# Patient Record
Sex: FEMALE | Race: WHITE | NOT HISPANIC OR LATINO | Employment: PART TIME | URBAN - METROPOLITAN AREA
[De-identification: names, ages, dates, MRNs, and addresses within clinical notes are randomized per-mention and may not be internally consistent; named-entity substitution may affect disease eponyms.]

---

## 2017-03-02 LAB
BUN SERPL-MCNC: 12 MG/DL (ref 7–18)
CALCIUM SERPL-MCNC: 9 MG/DL (ref 9–10.9)
CHLORIDE SERPL-SCNC: 104 MMOL/L (ref 98–116)
CHOLEST SERPL-MCNC: 213 MG/DL
CO2 SERPL-SCNC: 31 MMOL/L (ref 15–28)
CREAT SERPL-MCNC: 0.77 MG/DL (ref 0.6–1.3)
GLUCOSE SERPL-MCNC: 99 MG/DL (ref 74–106)
HDLC SERPL-MCNC: 64 MG/DL (ref 35–60)
LDLC SERPL CALC-MCNC: 131 MG/DL
POTASSIUM SERPL-SCNC: 4.5 MMOL/L (ref 3.5–5.1)
SODIUM SERPL-SCNC: 139 MEQ/L (ref 131–145)
TRIGL SERPL-MCNC: 95 MG/DL (ref 30–150)

## 2017-12-15 ENCOUNTER — HISTORICAL (OUTPATIENT)
Dept: URGENT CARE | Facility: CLINIC | Age: 47
End: 2017-12-15

## 2017-12-15 LAB
BILIRUB SERPL-MCNC: NORMAL MG/DL
BLOOD URINE, POC: NORMAL
CLARITY, POC UA: NORMAL
COLOR, POC UA: NORMAL
GLUCOSE UR QL STRIP: NEGATIVE
KETONES UR QL STRIP: NEGATIVE
LEUKOCYTE EST, POC UA: NORMAL
NITRITE, POC UA: POSITIVE
PH, POC UA: 5
PROTEIN, POC: NORMAL
SPECIFIC GRAVITY, POC UA: 1.01
UROBILINOGEN, POC UA: NORMAL

## 2017-12-22 LAB
INFLUENZA A ANTIGEN, POC: POSITIVE
INFLUENZA B ANTIGEN, POC: NEGATIVE

## 2018-01-25 LAB — RAPID GROUP A STREP (OHS): NEGATIVE

## 2018-03-01 ENCOUNTER — HISTORICAL (OUTPATIENT)
Dept: ADMINISTRATIVE | Facility: HOSPITAL | Age: 48
End: 2018-03-01

## 2018-03-01 LAB
ABS NEUT (OLG): 3
ALBUMIN SERPL-MCNC: 4.2 GM/DL (ref 3.4–5)
ALBUMIN/GLOB SERPL: 1.65 {RATIO} (ref 1.5–2.5)
ALP SERPL-CCNC: 102 UNIT/L (ref 38–126)
ALT SERPL-CCNC: 9 UNIT/L (ref 7–52)
AST SERPL-CCNC: 13 UNIT/L (ref 15–37)
BILIRUB SERPL-MCNC: 0.6 MG/DL (ref 0.2–1)
BILIRUBIN DIRECT+TOT PNL SERPL-MCNC: <0.2 MG/DL (ref 0–0.5)
BUN SERPL-MCNC: 16 MG/DL (ref 7–18)
CALCIUM SERPL-MCNC: 9.1 MG/DL (ref 8.5–10)
CHLORIDE SERPL-SCNC: 105 MMOL/L (ref 98–107)
CHOLEST SERPL-MCNC: 185 MG/DL (ref 0–200)
CHOLEST/HDLC SERPL: 3.9 {RATIO}
CO2 SERPL-SCNC: 31 MMOL/L (ref 21–32)
CREAT SERPL-MCNC: 0.76 MG/DL (ref 0.6–1.3)
CREAT/UREA NIT SERPL: 21.1
ERYTHROCYTE [DISTWIDTH] IN BLOOD BY AUTOMATED COUNT: 13.6 % (ref 11.5–17)
GGT SERPL-CCNC: 10 UNIT/L (ref 5–85)
GLOBULIN SER-MCNC: 2.6 GM/DL (ref 1.2–3)
GLUCOSE SERPL-MCNC: 98 MG/DL (ref 74–106)
HCT VFR BLD AUTO: 39.9 % (ref 37–47)
HDLC SERPL-MCNC: 47 MG/DL (ref 35–60)
HGB BLD-MCNC: 12.7 GM/DL (ref 12–16)
LDH SERPL-CCNC: 136 UNIT/L (ref 140–271)
LDLC SERPL CALC-MCNC: 105 MG/DL (ref 0–129)
LYMPHOCYTES # BLD AUTO: 2.3 X10(3)/MCL (ref 0.6–3.4)
LYMPHOCYTES NFR BLD AUTO: 38 % (ref 13–40)
MCH RBC QN AUTO: 26.7 PG (ref 27–31.2)
MCHC RBC AUTO-ENTMCNC: 32 GM/DL (ref 32–36)
MCV RBC AUTO: 84 FL (ref 80–94)
MONOCYTES # BLD AUTO: 0.7 X10(3)/MCL (ref 0–1.8)
MONOCYTES NFR BLD AUTO: 11.1 % (ref 0.1–24)
NEUTROPHILS NFR BLD AUTO: 50.9 % (ref 47–80)
PLATELET # BLD AUTO: 192 X10(3)/MCL (ref 130–400)
PMV BLD AUTO: 10.3 FL
POTASSIUM SERPL-SCNC: 4.7 MMOL/L (ref 3.5–5.1)
PROT SERPL-MCNC: 6.8 GM/DL (ref 6.4–8.2)
RBC # BLD AUTO: 4.76 X10(6)/MCL (ref 4.2–5.4)
SODIUM SERPL-SCNC: 140 MMOL/L (ref 136–145)
TRIGL SERPL-MCNC: 95 MG/DL (ref 30–150)
TSH SERPL-ACNC: 1.26 MIU/ML (ref 0.35–4.94)
VLDLC SERPL CALC-MCNC: 19 MG/DL
WBC # SPEC AUTO: 6 X10(3)/MCL (ref 4.5–11.5)

## 2018-07-31 LAB — RAPID GROUP A STREP (OHS): POSITIVE

## 2018-12-09 LAB
BILIRUB SERPL-MCNC: NEGATIVE MG/DL
BLOOD URINE, POC: NEGATIVE
CLARITY, POC UA: NORMAL
COLOR, POC UA: YELLOW
GLUCOSE UR QL STRIP: NEGATIVE
KETONES UR QL STRIP: NEGATIVE
LEUKOCYTE EST, POC UA: NORMAL
NITRITE, POC UA: NEGATIVE
PH, POC UA: 5
PROTEIN, POC: NEGATIVE
SPECIFIC GRAVITY, POC UA: 1.02
UROBILINOGEN, POC UA: NORMAL

## 2018-12-10 ENCOUNTER — HISTORICAL (OUTPATIENT)
Dept: URGENT CARE | Facility: CLINIC | Age: 48
End: 2018-12-10

## 2018-12-12 LAB — FINAL CULTURE: NORMAL

## 2019-03-15 ENCOUNTER — HISTORICAL (OUTPATIENT)
Dept: ADMINISTRATIVE | Facility: HOSPITAL | Age: 49
End: 2019-03-15

## 2019-03-15 LAB
ABS NEUT (OLG): 3.4 X10(3)/MCL (ref 2.1–9.2)
ALBUMIN SERPL-MCNC: 4.1 GM/DL (ref 3.4–5)
ALBUMIN/GLOB SERPL: 1.71 {RATIO} (ref 1.5–2.5)
ALP SERPL-CCNC: 93 UNIT/L (ref 38–126)
ALT SERPL-CCNC: 11 UNIT/L (ref 7–52)
AST SERPL-CCNC: 16 UNIT/L (ref 15–37)
BILIRUB SERPL-MCNC: 0.5 MG/DL (ref 0.2–1)
BILIRUBIN DIRECT+TOT PNL SERPL-MCNC: 0.1 MG/DL (ref 0–0.5)
BILIRUBIN DIRECT+TOT PNL SERPL-MCNC: 0.4 MG/DL
BUN SERPL-MCNC: 15 MG/DL (ref 7–18)
CALCIUM SERPL-MCNC: 8.5 MG/DL (ref 8.5–10)
CHLORIDE SERPL-SCNC: 107 MMOL/L (ref 98–107)
CHOLEST SERPL-MCNC: 207 MG/DL (ref 0–200)
CHOLEST/HDLC SERPL: 3.7 {RATIO}
CO2 SERPL-SCNC: 30 MMOL/L (ref 21–32)
CREAT SERPL-MCNC: 0.73 MG/DL (ref 0.6–1.3)
ERYTHROCYTE [DISTWIDTH] IN BLOOD BY AUTOMATED COUNT: 13.4 % (ref 11.5–17)
GLOBULIN SER-MCNC: 2.4 GM/DL (ref 1.2–3)
GLUCOSE SERPL-MCNC: 96 MG/DL (ref 74–106)
HCT VFR BLD AUTO: 39.4 % (ref 37–47)
HDLC SERPL-MCNC: 56 MG/DL (ref 35–60)
HGB BLD-MCNC: 13 GM/DL (ref 12–16)
LDLC SERPL CALC-MCNC: 105 MG/DL (ref 0–129)
LYMPHOCYTES # BLD AUTO: 2.4 X10(3)/MCL (ref 0.6–3.4)
LYMPHOCYTES NFR BLD AUTO: 39.4 % (ref 13–40)
MCH RBC QN AUTO: 28.4 PG (ref 27–31.2)
MCHC RBC AUTO-ENTMCNC: 33 GM/DL (ref 32–36)
MCV RBC AUTO: 86 FL (ref 80–94)
MONOCYTES # BLD AUTO: 0.4 X10(3)/MCL (ref 0.1–1.3)
MONOCYTES NFR BLD AUTO: 6.1 % (ref 0.1–24)
NEUTROPHILS NFR BLD AUTO: 54.5 % (ref 47–80)
PLATELET # BLD AUTO: 181 X10(3)/MCL (ref 130–400)
PMV BLD AUTO: 9.2 FL (ref 9.4–12.4)
POTASSIUM SERPL-SCNC: 3.9 MMOL/L (ref 3.5–5.1)
PROT SERPL-MCNC: 6.5 GM/DL (ref 6.4–8.2)
RBC # BLD AUTO: 4.57 X10(6)/MCL (ref 4.2–5.4)
SODIUM SERPL-SCNC: 139 MMOL/L (ref 136–145)
TRIGL SERPL-MCNC: 88 MG/DL (ref 30–150)
TSH SERPL-ACNC: 2.22 MIU/ML (ref 0.35–4.94)
VLDLC SERPL CALC-MCNC: 17.6 MG/DL
WBC # SPEC AUTO: 6.2 X10(3)/MCL (ref 4.5–11.5)

## 2019-06-22 ENCOUNTER — HISTORICAL (OUTPATIENT)
Dept: URGENT CARE | Facility: CLINIC | Age: 49
End: 2019-06-22

## 2019-06-22 LAB
BILIRUB SERPL-MCNC: NORMAL MG/DL
BLOOD URINE, POC: NORMAL
CLARITY, POC UA: CLEAR
COLOR, POC UA: NORMAL
GLUCOSE UR QL STRIP: NEGATIVE
KETONES UR QL STRIP: NEGATIVE
LEUKOCYTE EST, POC UA: NEGATIVE
NITRITE, POC UA: POSITIVE
PH, POC UA: 6.5
PROTEIN, POC: NORMAL
SPECIFIC GRAVITY, POC UA: 1.01
UROBILINOGEN, POC UA: NORMAL

## 2019-06-24 LAB — FINAL CULTURE: NO GROWTH

## 2020-05-10 ENCOUNTER — HISTORICAL (OUTPATIENT)
Dept: URGENT CARE | Facility: CLINIC | Age: 50
End: 2020-05-10

## 2020-05-10 LAB
BILIRUB SERPL-MCNC: NEGATIVE MG/DL
BLOOD URINE, POC: NEGATIVE
CLARITY, POC UA: CLEAR
COLOR, POC UA: YELLOW
GLUCOSE UR QL STRIP: NEGATIVE
KETONES UR QL STRIP: NEGATIVE
LEUKOCYTE EST, POC UA: NEGATIVE
NITRITE, POC UA: NEGATIVE
PH, POC UA: 5
PROTEIN, POC: NEGATIVE
SPECIFIC GRAVITY, POC UA: 1.02
UROBILINOGEN, POC UA: NORMAL

## 2020-05-12 LAB — FINAL CULTURE: NO GROWTH

## 2020-06-15 ENCOUNTER — HISTORICAL (OUTPATIENT)
Dept: ADMINISTRATIVE | Facility: HOSPITAL | Age: 50
End: 2020-06-15

## 2020-06-15 LAB
ABS NEUT (OLG): 3.4 X10(3)/MCL (ref 2.1–9.2)
ALBUMIN SERPL-MCNC: 4.5 GM/DL (ref 3.4–5)
ALBUMIN/GLOB SERPL: 1.96 {RATIO} (ref 1.5–2.5)
ALP SERPL-CCNC: 99 UNIT/L (ref 38–126)
ALT SERPL-CCNC: 15 UNIT/L (ref 7–52)
AST SERPL-CCNC: 14 UNIT/L (ref 15–37)
BILIRUB SERPL-MCNC: 0.5 MG/DL (ref 0.2–1)
BILIRUBIN DIRECT+TOT PNL SERPL-MCNC: 0.1 MG/DL (ref 0–0.5)
BILIRUBIN DIRECT+TOT PNL SERPL-MCNC: 0.4 MG/DL
BUN SERPL-MCNC: 18 MG/DL (ref 7–25)
CALCIUM SERPL-MCNC: 9.3 MG/DL (ref 8.5–10.1)
CHLORIDE SERPL-SCNC: 106 MMOL/L (ref 98–107)
CHOLEST SERPL-MCNC: 205 MG/DL (ref 0–200)
CHOLEST/HDLC SERPL: 3.2 {RATIO}
CO2 SERPL-SCNC: 31 MMOL/L (ref 21–32)
CREAT SERPL-MCNC: 0.84 MG/DL (ref 0.6–1.3)
ERYTHROCYTE [DISTWIDTH] IN BLOOD BY AUTOMATED COUNT: 12.6 % (ref 11.5–17)
GLOBULIN SER-MCNC: 2.3 GM/DL (ref 1.2–3)
GLUCOSE SERPL-MCNC: 99 MG/DL (ref 74–106)
HCT VFR BLD AUTO: 42.8 % (ref 42–52)
HDLC SERPL-MCNC: 64 MG/DL (ref 35–60)
HGB BLD-MCNC: 13.6 GM/DL (ref 14–18)
LDLC SERPL CALC-MCNC: 107 MG/DL (ref 0–129)
LYMPHOCYTES # BLD AUTO: 2.3 X10(3)/MCL (ref 0.6–3.4)
LYMPHOCYTES NFR BLD AUTO: 36.8 % (ref 13–40)
MCH RBC QN AUTO: 27.8 PG (ref 27–31)
MCHC RBC AUTO-ENTMCNC: 32 GM/DL (ref 33–36)
MCV RBC AUTO: 88 FL (ref 80–94)
MONOCYTES # BLD AUTO: 0.5 X10(3)/MCL (ref 0.1–1.3)
MONOCYTES NFR BLD AUTO: 7.3 % (ref 0.1–24)
NEUTROPHILS NFR BLD AUTO: 55.9 % (ref 47–80)
PLATELET # BLD AUTO: 200 X10(3)/MCL (ref 130–400)
PMV BLD AUTO: 9.8 FL (ref 9.4–12.4)
POTASSIUM SERPL-SCNC: 4.5 MMOL/L (ref 3.5–5.1)
PROT SERPL-MCNC: 6.8 GM/DL (ref 6.4–8.2)
RBC # BLD AUTO: 4.89 X10(6)/MCL (ref 4.7–6.1)
SODIUM SERPL-SCNC: 143 MMOL/L (ref 136–145)
TRIGL SERPL-MCNC: 79 MG/DL (ref 30–150)
TSH SERPL-ACNC: 1.52 MIU/ML (ref 0.35–4.94)
VLDLC SERPL CALC-MCNC: 15.8 MG/DL
WBC # SPEC AUTO: 6.2 X10(3)/MCL (ref 4.5–11.5)

## 2022-02-24 ENCOUNTER — HISTORICAL (OUTPATIENT)
Dept: ADMINISTRATIVE | Facility: HOSPITAL | Age: 52
End: 2022-02-24

## 2022-02-27 ENCOUNTER — HISTORICAL (OUTPATIENT)
Dept: ADMINISTRATIVE | Facility: HOSPITAL | Age: 52
End: 2022-02-27

## 2022-03-08 ENCOUNTER — HISTORICAL (OUTPATIENT)
Dept: ADMINISTRATIVE | Facility: HOSPITAL | Age: 52
End: 2022-03-08

## 2022-05-02 NOTE — HISTORICAL [facility] CERNER
This is a historical note converted from [employer]. Formatting and pictures may have been removed.  Please reference [employer] for original formatting and attached multimedia. Chief Complaint  WELLNESS CPX FAST  History of Present Illness  48 year old WF presents fasting for annual wellness  PMH: IBS  ?  , no kids, Homemaker  No Tob, No EtOH  No exercise  Diet: paleo diet  ?  GYN (Dr Jimmy Diallo)- PAP/MMG  GI (Dr Hall)  Review of Systems  Constitutional:?no fever, fatigue, weakness  Eye:?no vision loss, eye redness, drainage, or pain  ENMT:?no sore throat, ear pain, sinus pain/congestion, nasal congestion/drainage  Respiratory:?no cough, no wheezing, no shortness of breath  Cardiovascular:?no chest pain, no palpitations, no edema  Gastrointestinal:?no nausea, vomiting, or diarrhea. No abdominal pain  Genitourinary:?no dysuria, no urinary frequency or urgency, no hematuria  Hema/Lymph:?no abnormal bruising or bleeding  Endocrine:?no heat or cold intolerance, no excessive thirst or excessive urination  Musculoskeletal:?no muscle or joint pain, no joint swelling  Integumentary:?no skin rash or abnormal lesion  Neurologic: no headache, no dizziness, no weakness or numbness  ?  Physical Exam  Vitals & Measurements  T:?36.8? ?C (Oral)? HR:?56(Peripheral)? BP:?136/84?  HT:?162?cm? WT:?96.8?kg? BMI:?36.88?  General:?well-developed well-nourished in no acute distress  Eye: PERRLA, EOMI, clear conjunctiva, eyelids normal  HENT:?TMs/ear canals clear, oropharynx without erythema/exudate, oropharynx and nasal mucosal surfaces moist, no maxillary/frontal sinus tenderness to palpation  Neck: full range of motion, no thyromegaly or lymphadenopathy  Respiratory:?clear to auscultation bilaterally  Cardiovascular:?regular rate and rhythm without murmurs, gallops or rubs  Gastrointestinal:?soft, non-tender, non-distended with normal bowel sounds, without masses to palpation  Genitourinary: no CVA tenderness to  palpation  Musculoskeletal:?full range of motion of all extremities/spine without limitation or discomfort  Integumentary: no rashes or skin lesions present  Neurologic: cranial nerves intact, no signs of peripheral neurological deficit, motor/sensory function intact  ?  Assessment/Plan  1.?Well adult exam?Z00.00  ?LABS: CBC, CMP, TSH, FLP  Ordered:  Comprehensive Metabolic Panel, Routine collect, 03/15/19 7:33:00 CDT, Blood, Stop date 03/15/19 7:33:00 CDT, Lab Collect, Well adult exam, 03/15/19 7:33:00 CDT  Lipid Panel, Routine collect, 03/15/19 7:33:00 CDT, Blood, Stop date 03/15/19 7:33:00 CDT, Lab Collect, Well adult exam, 03/15/19 7:33:00 CDT  Thyroid Stimulating Hormone, Routine collect, 03/15/19 7:33:00 CDT, Blood, Stop date 03/15/19 7:33:00 CDT, Lab Collect, Well adult exam, 03/15/19 7:33:00 CDT  ?  2.?Irritable bowel syndrome?K58.9  Diet controlled  ?   Problem List/Past Medical History  Ongoing  Irritable bowel syndrome  Obesity  Historical  Abdominal pain  Diverticulitis  Lesion of liver  Obesity  Procedure/Surgical History  left wrist sx. 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 Exercise frequency: 3-4 times/week. Exercise type: Aerobics, Weight lifting., 03/01/2018  Home/Environment  Lives with Spouse. Living situation: Home/Independent., 01/29/2018  Nutrition/Health  Caffeine intake amount: 1 cup/day., 01/29/2018  Substance Abuse  Never, 12/15/2017  Tobacco - Denies Tobacco Use, 06/07/2015  Never (less than 100 in lifetime), N/A, 03/15/2019  Family History  COPD (chronic obstructive pulmonary disease).: Father.  Chronic renal failure: Mother.  Congestive heart disease.: Father.  Diabetes mellitus type 2: Mother, Father and Grandmother.  Ovarian cancer: Mother.  Health  Maintenance  Health Maintenance  ???Pending?(in the next year)  ??? ??OverDue  ??? ? ? ?Diabetes Screening due??and every?  ??? ??Due?  ??? ? ? ?ADL Screening due??03/15/19??and every 1??year(s)  ??? ? ? ?Alcohol Misuse Screening due??03/15/19??and every 1??year(s)  ??? ? ? ?Tetanus Vaccine due??03/15/19??and every 10??year(s)  ??? ??Due In Future?  ??? ? ? ?Blood Pressure Screening not due until??03/14/20??and every 1??year(s)  ??? ? ? ?Body Mass Index Check not due until??03/14/20??and every 1??year(s)  ??? ? ? ?Depression Screening not due until??03/14/20??and every 1??year(s)  ???Satisfied?(in the past 1 year)  ??? ??Satisfied?  ??? ? ? ?Blood Pressure Screening on??03/15/19.??Satisfied by Alannah Laurent LPN  ??? ? ? ?Body Mass Index Check on??03/15/19.??Satisfied by Alannah Laurent LPN  ??? ? ? ?Cervical Cancer Screening on??03/14/19.??Satisfied by Mahnaz Larkin  ??? ? ? ?Depression Screening on??03/15/19.??Satisfied by Alannah Laurent LPN  ??? ? ? ?Influenza Vaccine on??03/15/19.??Satisfied by Alannah Laurent LPN  ??? ? ? ?Obesity Screening on??03/15/19.??Satisfied by Alannah Laurent LPN  ?  ?

## 2022-05-02 NOTE — HISTORICAL [facility]
This is a historical note converted from [employer]. Formatting and pictures may have been removed.  Please reference [employer]an for original formatting and attached multimedia. Chief Complaint  WELLNESS CPX FAST  History of Present Illness  Here for CPX  Doing great. On Paleo-type diet for past year. Has lost approx 20lbs and has basically resolved her IBS issues. Hasnt Taken IBS meds for about 6 months.  ?  Review of Systems  Constitutional:?no fever, no weakness, no weight loss, no fatigue  Eye:?no eye redness, drainage, or pain  ENMT:?sore?throat pain, postnasal drainage, mucus production  Respiratory:?no wheezing,?no shortness of breath  Cardiovascular:?no chest pain, no STEARNS  Gastrointestinal:?no nausea, vomiting, or diarrhea. No abdominal mercedes, no hematochezia or melena  Musculoskeletal:?no muscle or joint pain, no joint swelling  Integumentary:?no skin rash or abnormal lesion  Neuro:?no headaches, dizziness, or weakness  Psych:?no anxiety, depression, or SI/HI  Endo:?no polyuria, polydipsia, or polyphagia  Heme/Lymph:?no bruising or lymphadenopathy  ?  Physical Exam  Vitals & Measurements  T:?36.7? ?C (Temporal Artery)? HR:?56(Peripheral)? BP:?112/72?  HT:?162.56?cm? HT:?162.56?cm? WT:?91.0?kg? WT:?91.0?kg? BMI:?34.44?  General:?Well developed, well-nourished, in no acute distress  Eye:?clear conjunctiva, eyelids normal  HENT:?no erythema, no exudate or swelling, TMs clear  Neck:?full range of motion, no cervical lymphadenopathy  Respiratory:?clear to auscultation bilaterally  Cardiovascular:?regular rate and rhythm without murmurs, gallops or rubs  Abdomen:?soft, NT, ND, NABS x4, no HSM  M/S:?no tenderness, joints WNL, FROM, neg SLR, no CCE  Neuro:?no motor/sensory deficits, Reflexes 2+ throughout, CN II-XII intact  Integumentary:?no rashes or skin lesions present  LN:?WNL  ?  Assessment/Plan  1.?Routine check-up  Ordered:  CBC w/ Auto Diff, Routine collect, 03/01/18 8:21:00 CST, Blood, Order for future visit, Stop  date 03/01/18 8:21:00 CST, Lab Collect, Routine check-up, 03/01/18 8:21:00 CST  CMP, Routine collect, 03/01/18 8:21:00 CST, Blood, Order for future visit, Stop date 03/01/18 8:21:00 CST, Lab Collect, Routine check-up, 03/01/18 8:21:00 CST  Lipid Panel, Routine collect, 03/01/18 8:21:00 CST, Blood, Order for future visit, Stop date 03/01/18 8:21:00 CST, Lab Collect, Routine check-up, 03/01/18 8:21:00 CST  Preventative Health Care New 40-64 years [id number] PC, Routine check-up, 03/01/18 8:21:00 CST  Thyroid Stimulating Hormone, Routine collect, 03/01/18 8:21:00 CST, Blood, Order for future visit, Stop date 03/01/18 8:21:00 CST, Lab Collect, Routine check-up, 03/01/18 8:21:00 CST  ?  2.?IBS (irritable bowel syndrome)  No meds at current  Diet controlled  ?  Orders:  Clinic Follow-up PRN, 03/01/18 8:21:00 CST, [facility] - AFP, Future Order  Rx given for screening mammogram   Problem List/Past Medical History  Ongoing  Irritable bowel syndrome  Historical  Abdominal pain  Diverticulitis  Lesion of liver  Obesity  Procedure/Surgical History  Mammogram - screening (02/23/2017), left wrist sx. (2001), S/P wrist surgery, Wrist.  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 Exercise frequency: 3-4 times/week. Exercise type: Aerobics, Weight lifting., 03/01/2018  Home/Environment  Lives with Spouse. Living situation: Home/Independent., 01/29/2018  Nutrition/Health  Caffeine intake amount: 1 cup/day., 01/29/2018  Substance Abuse  Never, 12/15/2017  Tobacco - Denies Tobacco Use, 06/07/2015  Never smoker, 06/07/2015  Family History  COPD (chronic obstructive pulmonary disease).: Father.  Chronic renal failure: Mother.  Congestive  heart disease.: Father.  Diabetes mellitus type 2: Mother, Father and Grandmother.  Ovarian cancer: Mother.

## 2022-05-02 NOTE — HISTORICAL [facility]
This is a historical note converted from [employer]. Formatting and pictures may have been removed.  Please reference [employer] for original formatting and attached multimedia. Chief Complaint  WELLNESS CPX FAST, DISCUSS ABD PAIN STILL PRESENT, HX OF PYLOPS  History of Present Illness  49 year old WF presents fasting for annual wellness  PMH: IBS  ?   , no kids, Works in [employer]  No Tob, No EtOH  Exercise: walks 3-4miles/week  Diet: paleo diet  ?   GYN (Dr Jimmy Diallo)- PAP/MMG  GI (Dr Hall)  ?   Seen here on 5/14/20: started on Omeprazole and overall states symptoms of epigastric discomfort has subsided. Has also changed diet and eating more fruits/veggies/salads, and chicken.  Review of Systems  Constitutional:?no fever, fatigue, weakness  Eye:?no vision loss, eye redness, drainage, or pain  ENMT:?no sore throat, ear pain, sinus pain/congestion  Respiratory:?no cough, no wheezing, no shortness of breath  Cardiovascular:?no chest pain, no palpitations, no edema  Gastrointestinal:?no nausea, vomiting, or diarrhea. No abdominal pain  Genitourinary:?no dysuria, no urinary frequency or urgency, no hematuria  Hema/Lymph:?no abnormal bruising or bleeding  Endocrine:?no heat or cold intolerance, no excessive thirst or excessive urination  Musculoskeletal:?no muscle or joint pain, no joint swelling  Integumentary:?no skin rash or abnormal lesion  Neurologic: no headache, no dizziness, no weakness or numbness  Physical Exam  Vitals & Measurements  T:?36.6? ?C (Oral)? HR:?64(Peripheral)? BP:?122/84?  HT:?162?cm? WT:?105.5?kg? BMI:?40.2?  General:?well-developed well-nourished in no acute distress  Eye: PERRLA, EOMI, clear conjunctiva, eyelids normal  HENT:?TMs/ear canals clear, oropharynx without erythema/exudate, oropharynx and nasal mucosal surfaces moist, no maxillary/frontal sinus tenderness to palpation  Neck: full range of motion, no thyromegaly or lymphadenopathy  Respiratory:?clear to  auscultation bilaterally  Cardiovascular:?regular rate and rhythm without murmurs, gallops or rubs  Gastrointestinal:?soft, non-tender, non-distended with normal bowel sounds, without masses to palpation  Genitourinary: no CVA tenderness to palpation  Musculoskeletal:?full range of motion of all extremities/spine without limitation or discomfort  Integumentary: no rashes or skin lesions present  Neurologic: cranial nerves intact, no signs of peripheral neurological deficit, motor/sensory function intact  Assessment/Plan  1.?Wellness examination?Z00.00  ?LABS: CBC, CMP, TSH, FLP  Ordered:  Comprehensive Metabolic Panel, Routine collect, 06/15/20 10:23:00 CDT, Blood, Stop date 06/15/20 10:23:00 CDT, Lab Collect, Wellness examination, 06/15/20 10:23:00 CDT  Lipid Panel, Routine collect, 06/15/20 10:23:00 CDT, Blood, Stop date 06/15/20 10:23:00 CDT, Lab Collect, Wellness examination, 06/15/20 10:23:00 CDT  Thyroid Stimulating Hormone, Routine collect, 06/15/20 10:23:00 CDT, Blood, Stop date 06/15/20 10:23:00 CDT, Lab Collect, Wellness examination, 06/15/20 10:23:00 CDT  ?  2.?Irritable bowel syndrome?K58.9  ?  3.?Epigastric fullness?R19.06  ?Last U/S 10/2017: indicated slight GB dysfunction along with small renal cyst  ?Recommend repeating U/S  ?   Problem List/Past Medical History  Ongoing  Irritable bowel syndrome  Obesity  Historical  Abdominal pain  Diverticulitis  Lesion of liver  Obesity  Procedure/Surgical History  left wrist sx. (2001)   Medications  cloNIDine, 0.1 mg= 1 tab(s), Oral, Once  Multivitamins and Minerals oral tablet, 1 tab, Oral, Daily  Probiotic Formula (Bacillus Coagulans), Oral, Daily  Vitamin B-12 100 mcg oral tablet, 100 mcg= 1 tab(s), Oral, Daily  Vitamin C 500 mg oral tablet, chewable, 1000 mg= 2 tab(s), Chewed, Daily  Allergies  No Known Allergies  Social History  Abuse/Neglect  No, 06/15/2020  Alcohol - Denies Alcohol Use, 06/07/2015  Never, 12/15/2017  Employment/School  Employed, Part  time, 06/15/2020  Home/Environment  Lives with Spouse. Living situation: Home/Independent., 01/29/2018  Nutrition/Health  Caffeine intake amount: 1 cup/day., 01/29/2018  Substance Use  Never, 12/15/2017  Tobacco - Denies Tobacco Use, 06/07/2015  Never (less than 100 in lifetime), N/A, 06/15/2020  Family History  COPD (chronic obstructive pulmonary disease).: Father.  Chronic renal failure: Mother.  Congestive heart disease.: Father.  Diabetes mellitus type 2: Mother, Father and Grandmother.  Ovarian cancer: Mother.  Health Maintenance  Health Maintenance  ???Pending?(in the next year)  ??? ??OverDue  ??? ? ? ?Diabetes Screening due??and every?  ??? ? ? ?Alcohol Misuse Screening due??01/01/20??and every 1??year(s)  ??? ??Due?  ??? ? ? ?ADL Screening due??06/15/20??and every 1??year(s)  ??? ? ? ?Tetanus Vaccine due??06/15/20??and every 10??year(s)  ??? ??Due In Future?  ??? ? ? ?Hypertension Management-BMP not due until??11/05/20??and every 1??year(s)  ??? ? ? ?Obesity Screening not due until??01/01/21??and every 1??year(s)  ???Satisfied?(in the past 1 year)  ??? ??Satisfied?  ??? ? ? ?Blood Pressure Screening on??06/15/20.??Satisfied by Alannah Laurent LPN  ??? ? ? ?Body Mass Index Check on??06/15/20.??Satisfied by Alannah Laurent LPN  ??? ? ? ?Depression Screening on??06/15/20.??Satisfied by Alannah Laurent LPN  ??? ? ? ?Diabetes Screening on??11/06/19.??Satisfied by Beatrice Adkins  ??? ? ? ?Hypertension Management-Blood Pressure on??06/15/20.??Satisfied by Alannah Laurent LPN  ??? ? ? ?Influenza Vaccine on??12/29/19.??Satisfied by Catalina Beckwith  ??? ? ? ?Obesity Screening on??06/15/20.??Satisfied by Alannah Laurent LPN  ?      Patient condition discussed?in detail with nurse practitioner.? Agree with plan of care?and follow-up.

## 2022-06-11 ENCOUNTER — OFFICE VISIT (OUTPATIENT)
Dept: URGENT CARE | Facility: CLINIC | Age: 52
End: 2022-06-11
Payer: COMMERCIAL

## 2022-06-11 VITALS
WEIGHT: 210 LBS | OXYGEN SATURATION: 97 % | TEMPERATURE: 99 F | DIASTOLIC BLOOD PRESSURE: 68 MMHG | BODY MASS INDEX: 35.85 KG/M2 | HEART RATE: 80 BPM | HEIGHT: 64 IN | SYSTOLIC BLOOD PRESSURE: 103 MMHG

## 2022-06-11 DIAGNOSIS — N30.00 ACUTE CYSTITIS WITHOUT HEMATURIA: Primary | ICD-10-CM

## 2022-06-11 DIAGNOSIS — R30.0 DYSURIA: ICD-10-CM

## 2022-06-11 LAB
BILIRUB UR QL STRIP: POSITIVE
GLUCOSE UR QL STRIP: NEGATIVE
KETONES UR QL STRIP: NEGATIVE
LEUKOCYTE ESTERASE UR QL STRIP: POSITIVE
PH, POC UA: 5
POC BLOOD, URINE: NEGATIVE
POC NITRATES, URINE: NEGATIVE
PROT UR QL STRIP: NEGATIVE
SP GR UR STRIP: 1.03 (ref 1–1.03)
UROBILINOGEN UR STRIP-ACNC: 2 (ref 0.1–1.1)

## 2022-06-11 PROCEDURE — 81003 POCT URINALYSIS, DIPSTICK, AUTOMATED, W/O SCOPE: ICD-10-PCS | Mod: QW,,, | Performed by: FAMILY MEDICINE

## 2022-06-11 PROCEDURE — 99213 OFFICE O/P EST LOW 20 MIN: CPT | Mod: ,,, | Performed by: FAMILY MEDICINE

## 2022-06-11 PROCEDURE — 1160F PR REVIEW ALL MEDS BY PRESCRIBER/CLIN PHARMACIST DOCUMENTED: ICD-10-PCS | Mod: CPTII,,, | Performed by: FAMILY MEDICINE

## 2022-06-11 PROCEDURE — 4010F PR ACE/ARB THEARPY RXD/TAKEN: ICD-10-PCS | Mod: CPTII,,, | Performed by: FAMILY MEDICINE

## 2022-06-11 PROCEDURE — 1159F MED LIST DOCD IN RCRD: CPT | Mod: CPTII,,, | Performed by: FAMILY MEDICINE

## 2022-06-11 PROCEDURE — 99213 PR OFFICE/OUTPT VISIT, EST, LEVL III, 20-29 MIN: ICD-10-PCS | Mod: ,,, | Performed by: FAMILY MEDICINE

## 2022-06-11 PROCEDURE — 81003 URINALYSIS AUTO W/O SCOPE: CPT | Mod: QW,,, | Performed by: FAMILY MEDICINE

## 2022-06-11 PROCEDURE — 1160F RVW MEDS BY RX/DR IN RCRD: CPT | Mod: CPTII,,, | Performed by: FAMILY MEDICINE

## 2022-06-11 PROCEDURE — 3074F PR MOST RECENT SYSTOLIC BLOOD PRESSURE < 130 MM HG: ICD-10-PCS | Mod: CPTII,,, | Performed by: FAMILY MEDICINE

## 2022-06-11 PROCEDURE — 3008F PR BODY MASS INDEX (BMI) DOCUMENTED: ICD-10-PCS | Mod: CPTII,,, | Performed by: FAMILY MEDICINE

## 2022-06-11 PROCEDURE — 3008F BODY MASS INDEX DOCD: CPT | Mod: CPTII,,, | Performed by: FAMILY MEDICINE

## 2022-06-11 PROCEDURE — 1159F PR MEDICATION LIST DOCUMENTED IN MEDICAL RECORD: ICD-10-PCS | Mod: CPTII,,, | Performed by: FAMILY MEDICINE

## 2022-06-11 PROCEDURE — 3074F SYST BP LT 130 MM HG: CPT | Mod: CPTII,,, | Performed by: FAMILY MEDICINE

## 2022-06-11 PROCEDURE — 3078F DIAST BP <80 MM HG: CPT | Mod: CPTII,,, | Performed by: FAMILY MEDICINE

## 2022-06-11 PROCEDURE — 3078F PR MOST RECENT DIASTOLIC BLOOD PRESSURE < 80 MM HG: ICD-10-PCS | Mod: CPTII,,, | Performed by: FAMILY MEDICINE

## 2022-06-11 PROCEDURE — 4010F ACE/ARB THERAPY RXD/TAKEN: CPT | Mod: CPTII,,, | Performed by: FAMILY MEDICINE

## 2022-06-11 RX ORDER — SULFAMETHOXAZOLE AND TRIMETHOPRIM 200; 40 MG/5ML; MG/5ML
10 SUSPENSION ORAL EVERY 12 HOURS
Qty: 100 ML | Refills: 0 | Status: SHIPPED | OUTPATIENT
Start: 2022-06-11 | End: 2022-06-11

## 2022-06-11 RX ORDER — SULFAMETHOXAZOLE AND TRIMETHOPRIM 800; 160 MG/1; MG/1
1 TABLET ORAL 2 TIMES DAILY
Qty: 10 TABLET | Refills: 0 | Status: SHIPPED | OUTPATIENT
Start: 2022-06-11 | End: 2022-06-16

## 2022-06-11 RX ORDER — LISINOPRIL 10 MG/1
10 TABLET ORAL DAILY
COMMUNITY
Start: 2022-05-20

## 2022-06-11 NOTE — PROGRESS NOTES
"Subjective:       Patient ID: Dulce Maria Solis is a 51 y.o. female.    Vitals:  height is 5' 4" (1.626 m) and weight is 95.3 kg (210 lb). Her temperature is 98.7 °F (37.1 °C). Her blood pressure is 103/68 and her pulse is 80. Her oxygen saturation is 97%.     Chief Complaint: Urinary Tract Infection (Symptoms since yesterday.  Burning around opening of urethra)    Urinary Tract Infection   This is a new problem. The current episode started yesterday. The problem occurs every urination. The problem has been gradually worsening. The quality of the pain is described as burning. The pain is at a severity of 2/10. The pain is mild. There has been no fever. The fever has been present for less than 1 day. She is sexually active. There is no history of pyelonephritis. She has tried nothing for the symptoms. The treatment provided no relief.     [place] :  51-year-old with known history of hypertension currently on medications.  Present to clinic with concerns of bladder symptoms since yesterday.  Mainly complains of burning with urination, urgency and frequency.  No blood in the urine.  No fever, no flank pain.  Last bladder infection 2 years ago.  Reviewed the blood pressure with patient.  States in the past blood pressure has been elevated.  Started taking medications since March 2022 has an appointment with primary MD in October.  Discussed in detail on low blood pressure and encouraged to monitor the blood pressure and maintain the log.  States patient has started dietary changes and lost some weight.    ROS  :  Constitutional : _No fever, no fatigue or weakness  Neck : _Negative except HPI  Respiratory :_ No coughing, no shortness of breath  Cardiovascular : _No chest pain, no palpitations  Gastrointestinal : _No nausea, no vomiting  Genitourinary : _No flank pain, no vaginal discharge  Integumentary : _Negative for skin rash  Objective:      Physical Exam    General : Alert and Oriented, No apparent distress, " afebrile  Neck - supple  Respiratory : Bilateral equal breath sounds, nonlabored respirations, clear to auscultate   Cardiovascular : Rate rhythm regular, normal volume pulse  Gastrointestinal : Soft, mild suprapubic pressure on palpation , BS X 4 quadrants - normal  Genitourinary : No CVA tenderness Bilateral  Integumentary : Warm, Dry and no rash  Assessment:       1. Acute cystitis without hematuria    2. Dysuria          Plan:       Adequate hydration. Medications as prescribed.   Deferred urine cultures.  For persistent symptoms will consider urine culture.  Voiced understanding   ER precautions with worsening symptoms, fever, flank pain, not able to urinate.   Call or return to clinic as needed. Voiced understanding verbally.  Urine dipstick results shared.  AZO over-the-counter or bladder symptoms as needed  Acute cystitis without hematuria  -     sulfamethoxazole-trimethoprim 200-40 mg/5 ml (BACTRIM,SEPTRA) 200-40 mg/5 mL Susp; Take 10 mLs by mouth every 12 (twelve) hours. for 5 days  Dispense: 100 mL; Refill: 0    Dysuria  -     POCT Urinalysis, Dipstick, Automated, W/O Scope    Discussed in detail on blood pressure in the clinic today.  Encouraged to monitor blood pressure and maintain the log.  With dietary changes and weight loss possible improved blood pressure.  Patient will follow-up with primary MD with the log.  Call this clinic for any questions

## 2022-06-29 ENCOUNTER — OFFICE VISIT (OUTPATIENT)
Dept: URGENT CARE | Facility: CLINIC | Age: 52
End: 2022-06-29
Payer: COMMERCIAL

## 2022-06-29 VITALS
HEIGHT: 64 IN | DIASTOLIC BLOOD PRESSURE: 82 MMHG | OXYGEN SATURATION: 98 % | HEART RATE: 76 BPM | TEMPERATURE: 99 F | RESPIRATION RATE: 18 BRPM | SYSTOLIC BLOOD PRESSURE: 122 MMHG | WEIGHT: 212 LBS | BODY MASS INDEX: 36.19 KG/M2

## 2022-06-29 DIAGNOSIS — R21 RASH/SKIN ERUPTION: Primary | ICD-10-CM

## 2022-06-29 PROCEDURE — 3079F PR MOST RECENT DIASTOLIC BLOOD PRESSURE 80-89 MM HG: ICD-10-PCS | Mod: CPTII,,, | Performed by: FAMILY MEDICINE

## 2022-06-29 PROCEDURE — 3008F BODY MASS INDEX DOCD: CPT | Mod: CPTII,,, | Performed by: FAMILY MEDICINE

## 2022-06-29 PROCEDURE — 1160F RVW MEDS BY RX/DR IN RCRD: CPT | Mod: CPTII,,, | Performed by: FAMILY MEDICINE

## 2022-06-29 PROCEDURE — 99212 OFFICE O/P EST SF 10 MIN: CPT | Mod: ,,, | Performed by: FAMILY MEDICINE

## 2022-06-29 PROCEDURE — 99212 PR OFFICE/OUTPT VISIT, EST, LEVL II, 10-19 MIN: ICD-10-PCS | Mod: ,,, | Performed by: FAMILY MEDICINE

## 2022-06-29 PROCEDURE — 3074F SYST BP LT 130 MM HG: CPT | Mod: CPTII,,, | Performed by: FAMILY MEDICINE

## 2022-06-29 PROCEDURE — 3074F PR MOST RECENT SYSTOLIC BLOOD PRESSURE < 130 MM HG: ICD-10-PCS | Mod: CPTII,,, | Performed by: FAMILY MEDICINE

## 2022-06-29 PROCEDURE — 3079F DIAST BP 80-89 MM HG: CPT | Mod: CPTII,,, | Performed by: FAMILY MEDICINE

## 2022-06-29 PROCEDURE — 3008F PR BODY MASS INDEX (BMI) DOCUMENTED: ICD-10-PCS | Mod: CPTII,,, | Performed by: FAMILY MEDICINE

## 2022-06-29 PROCEDURE — 4010F ACE/ARB THERAPY RXD/TAKEN: CPT | Mod: CPTII,,, | Performed by: FAMILY MEDICINE

## 2022-06-29 PROCEDURE — 4010F PR ACE/ARB THEARPY RXD/TAKEN: ICD-10-PCS | Mod: CPTII,,, | Performed by: FAMILY MEDICINE

## 2022-06-29 PROCEDURE — 1159F PR MEDICATION LIST DOCUMENTED IN MEDICAL RECORD: ICD-10-PCS | Mod: CPTII,,, | Performed by: FAMILY MEDICINE

## 2022-06-29 PROCEDURE — 1159F MED LIST DOCD IN RCRD: CPT | Mod: CPTII,,, | Performed by: FAMILY MEDICINE

## 2022-06-29 PROCEDURE — 1160F PR REVIEW ALL MEDS BY PRESCRIBER/CLIN PHARMACIST DOCUMENTED: ICD-10-PCS | Mod: CPTII,,, | Performed by: FAMILY MEDICINE

## 2022-06-29 NOTE — PROGRESS NOTES
"Subjective:       Patient ID: Dulce Maria Solis is a 51 y.o. female.    Vitals:  height is 5' 4" (1.626 m) and weight is 96.2 kg (212 lb). Her temperature is 98.7 °F (37.1 °C). Her blood pressure is 122/82 and her pulse is 76. Her respiration is 18 and oxygen saturation is 98%.     Chief Complaint: Rash (Possible shingles left side lower abdomen, has had shingles before she is not sure, started this morning, has had some tingling in her back and has been under stress, no pain associated with rash)    HPI:  51-year-old present to clinic with concerns of itchy rash left lower abdominal wall above the groin since morning.  Felt this came discomfort low back yesterday.  History of chickenpox as a child, history of shingles in the past.  No pain.  States has been outside helping at the summer camp and increased sweating    ROS  :  Constitutional : No fever, no weakness  HEENT : No sore throat, no difficulty swallowing  Neck : Negative except HPI  Respiratory : No cough, no shortness of breath or wheezing  Cardiovascular : No chest pain  Integumentary : Negative except HPI  Neurological : No tingling, no weakness  Objective:      Physical Exam    General : Alert and oriented, No apparent distress, Afebrile  Neck -: supple, Non Tender  Respiratory :Lungs are clear to auscultate, Breath sounds are equal  Cardiovascular : Normal rate, normal volume pulse bilateral  Musculoskeletal :  Gait appears normal, joints full range of motion.  Integumentary : Warm, Dry and left lower abdominal wall above the groin 1.5 cm flat erythematous rash, no blistery lesion.  Nontender to palpate.  Skin appears dry  Neurologic : Alert and Oriented X 4, sensations intact, motor intact  Assessment:       1. Rash/skin eruption          Plan:     discussed the physical finding, differential diagnosis.  Monitor the symptoms.  Trial of topical hydrocortisone cream over-the-counter.  Topical moisturizing lotion  For worsening symptoms like pain and " blistery rash call clinic will consider Valtrex 1 g 3 times a day for 7 days  Call or return to clinic for any questions.  Voiced understanding    Rash/skin eruption

## 2022-06-30 ENCOUNTER — TELEPHONE (OUTPATIENT)
Dept: URGENT CARE | Facility: CLINIC | Age: 52
End: 2022-06-30
Payer: COMMERCIAL

## 2022-06-30 RX ORDER — PREDNISONE 20 MG/1
20 TABLET ORAL 2 TIMES DAILY
Qty: 10 TABLET | Refills: 0 | Status: SHIPPED | OUTPATIENT
Start: 2022-06-30 | End: 2022-07-05

## 2022-06-30 RX ORDER — VALACYCLOVIR HYDROCHLORIDE 1 G/1
1000 TABLET, FILM COATED ORAL 3 TIMES DAILY
Qty: 21 TABLET | Refills: 0 | Status: SHIPPED | OUTPATIENT
Start: 2022-06-30 | End: 2022-07-07

## 2022-06-30 NOTE — TELEPHONE ENCOUNTER
Pt returned call. She voiced thanks and will  script.     ----- Message from Juan eHdrick LPN sent at 6/30/2022  1:30 PM CDT -----  Regarding: FW: Prescription  Attempted to call pt. No answer. LVM for call back     ----- Message -----  From: Monica Hayward MD  Sent: 6/30/2022  12:57 PM CDT  To: [facility] Clinical Support Staff  Subject: FW: Prescription                                 Call or notify patient with prescription sent to the pharmacy.  Valtrex and prednisone for symptom relief as needed.  For any questions and concerns Can return to clinic  ----- Message -----  From: Juan Hedrick LPN  Sent: 6/30/2022  11:35 AM CDT  To: [facility] Results  Subject: FW: Prescription                                   ----- Message -----  From: Gerri Henriquez  Sent: 6/30/2022   8:47 AM CDT  To: [facility] Clinical Support Staff  Subject: Prescription                                     Discussed a medication at visit yesterday, stated today there is a little more pain and tingling with the rash, so believing it is the onset of shingles.  Would like the prescription to go ahead and be sent in.    Pharmacy - [facility] [address] [facility] [address]    Call back #[phone]

## 2022-07-08 ENCOUNTER — TELEPHONE (OUTPATIENT)
Dept: URGENT CARE | Facility: CLINIC | Age: 52
End: 2022-07-08
Payer: COMMERCIAL

## 2022-07-08 NOTE — TELEPHONE ENCOUNTER
Pt called requesting refill on her Shingles medication. She is concerned due to red spots appearing on her abdomen today and thinks shingles has spread. Please advise. Call back number is [phone].

## 2022-07-09 ENCOUNTER — HOSPITAL ENCOUNTER (EMERGENCY)
Facility: HOSPITAL | Age: 52
Discharge: HOME | End: 2022-07-09
Attending: EMERGENCY MEDICINE
Payer: COMMERCIAL

## 2022-07-09 ENCOUNTER — OFFICE VISIT (OUTPATIENT)
Dept: URGENT CARE | Facility: CLINIC | Age: 52
End: 2022-07-09
Payer: COMMERCIAL

## 2022-07-09 VITALS
TEMPERATURE: 100 F | HEART RATE: 84 BPM | SYSTOLIC BLOOD PRESSURE: 115 MMHG | RESPIRATION RATE: 16 BRPM | OXYGEN SATURATION: 98 % | WEIGHT: 210 LBS | HEIGHT: 64 IN | BODY MASS INDEX: 35.85 KG/M2 | DIASTOLIC BLOOD PRESSURE: 76 MMHG

## 2022-07-09 VITALS
WEIGHT: 210 LBS | HEART RATE: 115 BPM | RESPIRATION RATE: 17 BRPM | HEIGHT: 64 IN | TEMPERATURE: 102 F | BODY MASS INDEX: 35.85 KG/M2 | SYSTOLIC BLOOD PRESSURE: 94 MMHG | OXYGEN SATURATION: 95 % | DIASTOLIC BLOOD PRESSURE: 69 MMHG

## 2022-07-09 DIAGNOSIS — N39.0 URINARY TRACT INFECTION WITHOUT HEMATURIA, SITE UNSPECIFIED: Primary | ICD-10-CM

## 2022-07-09 DIAGNOSIS — R68.83 CHILLS: Primary | ICD-10-CM

## 2022-07-09 DIAGNOSIS — R50.9 FEVER, UNSPECIFIED FEVER CAUSE: ICD-10-CM

## 2022-07-09 DIAGNOSIS — N30.00 ACUTE CYSTITIS WITHOUT HEMATURIA: ICD-10-CM

## 2022-07-09 DIAGNOSIS — J10.1 INFLUENZA A: ICD-10-CM

## 2022-07-09 DIAGNOSIS — J11.1 INFLUENZA: ICD-10-CM

## 2022-07-09 LAB
ALBUMIN SERPL-MCNC: 3.8 GM/DL (ref 3.5–5)
ALBUMIN/GLOB SERPL: 1.2 RATIO (ref 1.1–2)
ALP SERPL-CCNC: 154 UNIT/L (ref 40–150)
ALT SERPL-CCNC: 19 UNIT/L (ref 0–55)
AST SERPL-CCNC: 23 UNIT/L (ref 5–34)
BASOPHILS # BLD AUTO: 0.03 X10(3)/MCL (ref 0–0.2)
BASOPHILS NFR BLD AUTO: 0.3 %
BILIRUB UR QL STRIP: POSITIVE
BILIRUBIN DIRECT+TOT PNL SERPL-MCNC: 0.8 MG/DL
BUN SERPL-MCNC: 11.6 MG/DL (ref 9.8–20.1)
CALCIUM SERPL-MCNC: 8.9 MG/DL (ref 8.4–10.2)
CHLORIDE SERPL-SCNC: 104 MMOL/L (ref 98–107)
CO2 SERPL-SCNC: 22 MMOL/L (ref 22–29)
CREAT SERPL-MCNC: 0.97 MG/DL (ref 0.55–1.02)
CTP QC/QA: YES
EOSINOPHIL # BLD AUTO: 0.21 X10(3)/MCL (ref 0–0.9)
EOSINOPHIL NFR BLD AUTO: 2.4 %
ERYTHROCYTE [DISTWIDTH] IN BLOOD BY AUTOMATED COUNT: 14 % (ref 11.5–17)
FLUAV AG NPH QL: POSITIVE
FLUBV AG NPH QL: NEGATIVE
GLOBULIN SER-MCNC: 3.3 GM/DL (ref 2.4–3.5)
GLUCOSE SERPL-MCNC: 129 MG/DL (ref 74–100)
GLUCOSE UR QL STRIP: NEGATIVE
HCT VFR BLD AUTO: 44.4 % (ref 37–47)
HGB BLD-MCNC: 14 GM/DL (ref 12–16)
IMM GRANULOCYTES # BLD AUTO: 0.03 X10(3)/MCL (ref 0–0.04)
IMM GRANULOCYTES NFR BLD AUTO: 0.3 %
KETONES UR QL STRIP: NEGATIVE
LACTATE SERPL-SCNC: 1.1 MMOL/L (ref 0.5–2.2)
LEUKOCYTE ESTERASE UR QL STRIP: POSITIVE
LYMPHOCYTES # BLD AUTO: 0.84 X10(3)/MCL (ref 0.6–4.6)
LYMPHOCYTES NFR BLD AUTO: 9.5 %
MCH RBC QN AUTO: 27.9 PG (ref 27–31)
MCHC RBC AUTO-ENTMCNC: 31.5 MG/DL (ref 33–36)
MCV RBC AUTO: 88.4 FL (ref 80–94)
MONOCYTES # BLD AUTO: 0.75 X10(3)/MCL (ref 0.1–1.3)
MONOCYTES NFR BLD AUTO: 8.5 %
NEUTROPHILS # BLD AUTO: 7 X10(3)/MCL (ref 2.1–9.2)
NEUTROPHILS NFR BLD AUTO: 79 %
NRBC BLD AUTO-RTO: 0 %
PH, POC UA: 7
PLATELET # BLD AUTO: 163 X10(3)/MCL (ref 130–400)
PMV BLD AUTO: 9.6 FL (ref 7.4–10.4)
POC BLOOD, URINE: POSITIVE
POC NITRATES, URINE: POSITIVE
POTASSIUM SERPL-SCNC: 4.3 MMOL/L (ref 3.5–5.1)
PROT SERPL-MCNC: 7.1 GM/DL (ref 6.4–8.3)
PROT UR QL STRIP: POSITIVE
RBC # BLD AUTO: 5.02 X10(6)/MCL (ref 4.2–5.4)
SODIUM SERPL-SCNC: 137 MMOL/L (ref 136–145)
SP GR UR STRIP: 1.01 (ref 1–1.03)
UROBILINOGEN UR STRIP-ACNC: POSITIVE (ref 0.1–1.1)
WBC # SPEC AUTO: 8.9 X10(3)/MCL (ref 4.5–11.5)

## 2022-07-09 PROCEDURE — 63600175 PHARM REV CODE 636 W HCPCS: Performed by: PHYSICIAN ASSISTANT

## 2022-07-09 PROCEDURE — 96374 THER/PROPH/DIAG INJ IV PUSH: CPT

## 2022-07-09 PROCEDURE — 1160F RVW MEDS BY RX/DR IN RCRD: CPT | Mod: CPTII,,, | Performed by: PHYSICIAN ASSISTANT

## 2022-07-09 PROCEDURE — 99214 OFFICE O/P EST MOD 30 MIN: CPT | Mod: 25,,, | Performed by: PHYSICIAN ASSISTANT

## 2022-07-09 PROCEDURE — 3078F PR MOST RECENT DIASTOLIC BLOOD PRESSURE < 80 MM HG: ICD-10-PCS | Mod: CPTII,,, | Performed by: PHYSICIAN ASSISTANT

## 2022-07-09 PROCEDURE — 4010F PR ACE/ARB THEARPY RXD/TAKEN: ICD-10-PCS | Mod: CPTII,,, | Performed by: PHYSICIAN ASSISTANT

## 2022-07-09 PROCEDURE — 87804 POCT INFLUENZA A/B: ICD-10-PCS | Mod: QW,,, | Performed by: PHYSICIAN ASSISTANT

## 2022-07-09 PROCEDURE — 81003 URINALYSIS AUTO W/O SCOPE: CPT | Mod: QW,,, | Performed by: PHYSICIAN ASSISTANT

## 2022-07-09 PROCEDURE — 96361 HYDRATE IV INFUSION ADD-ON: CPT

## 2022-07-09 PROCEDURE — 36415 COLL VENOUS BLD VENIPUNCTURE: CPT | Performed by: PHYSICIAN ASSISTANT

## 2022-07-09 PROCEDURE — 99214 PR OFFICE/OUTPT VISIT, EST, LEVL IV, 30-39 MIN: ICD-10-PCS | Mod: 25,,, | Performed by: PHYSICIAN ASSISTANT

## 2022-07-09 PROCEDURE — 1160F PR REVIEW ALL MEDS BY PRESCRIBER/CLIN PHARMACIST DOCUMENTED: ICD-10-PCS | Mod: CPTII,,, | Performed by: PHYSICIAN ASSISTANT

## 2022-07-09 PROCEDURE — 3074F SYST BP LT 130 MM HG: CPT | Mod: CPTII,,, | Performed by: PHYSICIAN ASSISTANT

## 2022-07-09 PROCEDURE — 1159F MED LIST DOCD IN RCRD: CPT | Mod: CPTII,,, | Performed by: PHYSICIAN ASSISTANT

## 2022-07-09 PROCEDURE — 4010F ACE/ARB THERAPY RXD/TAKEN: CPT | Mod: CPTII,,, | Performed by: PHYSICIAN ASSISTANT

## 2022-07-09 PROCEDURE — 87040 BLOOD CULTURE FOR BACTERIA: CPT | Performed by: PHYSICIAN ASSISTANT

## 2022-07-09 PROCEDURE — 80053 COMPREHEN METABOLIC PANEL: CPT | Performed by: PHYSICIAN ASSISTANT

## 2022-07-09 PROCEDURE — 3074F PR MOST RECENT SYSTOLIC BLOOD PRESSURE < 130 MM HG: ICD-10-PCS | Mod: CPTII,,, | Performed by: PHYSICIAN ASSISTANT

## 2022-07-09 PROCEDURE — 3008F BODY MASS INDEX DOCD: CPT | Mod: CPTII,,, | Performed by: PHYSICIAN ASSISTANT

## 2022-07-09 PROCEDURE — 3078F DIAST BP <80 MM HG: CPT | Mod: CPTII,,, | Performed by: PHYSICIAN ASSISTANT

## 2022-07-09 PROCEDURE — 99284 EMERGENCY DEPT VISIT MOD MDM: CPT | Mod: 25

## 2022-07-09 PROCEDURE — 25000003 PHARM REV CODE 250: Performed by: PHYSICIAN ASSISTANT

## 2022-07-09 PROCEDURE — 3008F PR BODY MASS INDEX (BMI) DOCUMENTED: ICD-10-PCS | Mod: CPTII,,, | Performed by: PHYSICIAN ASSISTANT

## 2022-07-09 PROCEDURE — 81003 POCT URINALYSIS, DIPSTICK, MANUAL, W/O SCOPE: ICD-10-PCS | Mod: QW,,, | Performed by: PHYSICIAN ASSISTANT

## 2022-07-09 PROCEDURE — 87804 INFLUENZA ASSAY W/OPTIC: CPT | Mod: QW,,, | Performed by: PHYSICIAN ASSISTANT

## 2022-07-09 PROCEDURE — 83605 ASSAY OF LACTIC ACID: CPT | Performed by: PHYSICIAN ASSISTANT

## 2022-07-09 PROCEDURE — 1159F PR MEDICATION LIST DOCUMENTED IN MEDICAL RECORD: ICD-10-PCS | Mod: CPTII,,, | Performed by: PHYSICIAN ASSISTANT

## 2022-07-09 PROCEDURE — 85025 COMPLETE CBC W/AUTO DIFF WBC: CPT | Performed by: PHYSICIAN ASSISTANT

## 2022-07-09 RX ORDER — CEFDINIR 300 MG/1
300 CAPSULE ORAL EVERY 12 HOURS
Qty: 20 CAPSULE | Refills: 0 | Status: SHIPPED | OUTPATIENT
Start: 2022-07-09 | End: 2022-07-19

## 2022-07-09 RX ORDER — OSELTAMIVIR PHOSPHATE 75 MG/1
75 CAPSULE ORAL 2 TIMES DAILY
Qty: 10 CAPSULE | Refills: 0 | Status: SHIPPED | OUTPATIENT
Start: 2022-07-09 | End: 2022-07-14

## 2022-07-09 RX ORDER — ACETAMINOPHEN 500 MG
1000 TABLET ORAL
Status: COMPLETED | OUTPATIENT
Start: 2022-07-09 | End: 2022-07-09

## 2022-07-09 RX ADMIN — Medication 1000 MG: at 08:07

## 2022-07-09 RX ADMIN — SODIUM CHLORIDE 1000 ML: 9 INJECTION, SOLUTION INTRAVENOUS at 03:07

## 2022-07-09 RX ADMIN — PIPERACILLIN SODIUM, TAZOBACTAM SODIUM 4.5 G: 4; .5 INJECTION, POWDER, LYOPHILIZED, FOR SOLUTION INTRAVENOUS at 04:07

## 2022-07-09 NOTE — PROGRESS NOTES
"Subjective:       Patient ID: Dulce Maria Solis is a 51 y.o. female.    Vitals:  height is 5' 4" (1.626 m) and weight is 95.3 kg (210 lb). Her temperature is 102.4 °F (39.1 °C) (abnormal). Her blood pressure is 94/69 and her pulse is 115 (abnormal). Her respiration is 17 and oxygen saturation is 95%.     Chief Complaint: Rash, Urinary Tract Infection, and Generalized Body Aches    Is recovering from urinary tract infection last month in clinic states now having body aches fever and chills over the last 2-3 days with persistent night sweats now having dysuria and concern for diffuse abdominal wall rash previously treated for shingles returns to clinic for re-evaluation  Rash - on stomach x last night   Body aches, chills, headache x 3 days   Painful urination x last night     Rash  This is a new problem. Associated symptoms include fatigue and a fever. Pertinent negatives include no cough, diarrhea, shortness of breath or vomiting.   Urinary Tract Infection   Associated symptoms include chills, nausea and rash. Pertinent negatives include no vomiting.       Constitution: Positive for chills, sweating, fatigue and fever.   Respiratory: Negative for cough and shortness of breath.    Gastrointestinal: Positive for nausea. Negative for vomiting and diarrhea.   Genitourinary: Positive for dysuria.   Musculoskeletal: Positive for muscle ache.   Skin: Positive for rash.   Neurological: Positive for light-headedness.       Objective:      Physical Exam   Constitutional: She is oriented to person, place, and time. She appears well-developed. She is cooperative.  Non-toxic appearance. She appears ill. She appears distressed.      Comments:Wake alert weak appearing female     HENT:   Head: Normocephalic.   Ears:   Right Ear: Hearing, tympanic membrane, external ear and ear canal normal.   Left Ear: Hearing, tympanic membrane, external ear and ear canal normal.   Nose: Nose normal. No mucosal edema, rhinorrhea or nasal deformity. " No epistaxis. Right sinus exhibits no maxillary sinus tenderness and no frontal sinus tenderness. Left sinus exhibits no maxillary sinus tenderness and no frontal sinus tenderness.   Mouth/Throat: Uvula is midline, oropharynx is clear and moist and mucous membranes are normal. No trismus in the jaw. Normal dentition. No uvula swelling. No oropharyngeal exudate, posterior oropharyngeal edema or posterior oropharyngeal erythema.   Eyes: Conjunctivae and lids are normal. No scleral icterus.   Neck: Trachea normal and phonation normal. Neck supple. No edema present. No erythema present. No neck rigidity present.   Cardiovascular: Regular rhythm, normal heart sounds and normal pulses. Tachycardia present.   Pulmonary/Chest: Effort normal and breath sounds normal. No respiratory distress. She has no decreased breath sounds. She has no rhonchi.   Musculoskeletal: Normal range of motion.         General: No deformity. Normal range of motion.   Neurological: She is alert and oriented to person, place, and time. She exhibits normal muscle tone. Coordination normal.   Skin: Skin is warm, dry, intact, not diaphoretic and not pale.   Psychiatric: Her speech is normal and behavior is normal. Judgment and thought content normal.   Nursing note and vitals reviewed.         Previous History      Review of patient's allergies indicates:  No Known Allergies    Past Medical History:   Diagnosis Date    Hypertension      Current Outpatient Medications   Medication Instructions    lisinopriL 10 mg, Oral, Daily    valACYclovir (VALTREX) 1,000 mg, Oral, 3 times daily     Past Surgical History:   Procedure Laterality Date    CHOLECYSTECTOMY       Family History   Problem Relation Age of Onset    Diabetes Mother     Diabetes Father        Social History     Tobacco Use    Smoking status: Never Smoker    Smokeless tobacco: Never Used   Substance Use Topics    Alcohol use: Never    Drug use: Never        Physical Exam      Vital  "Signs Reviewed   BP 94/69   Pulse (!) 115   Temp (!) 102.4 °F (39.1 °C)   Resp 17   Ht 5' 4" (1.626 m)   Wt 95.3 kg (210 lb)   SpO2 95%   BMI 36.05 kg/m²        Procedures    Procedures     Labs     Results for orders placed or performed in visit on 07/09/22   POCT Influenza A/B   Result Value Ref Range    Rapid Influenza A Ag Positive (A) Negative    Rapid Influenza B Ag Negative Negative     Acceptable Yes    POCT Urinalysis, Dipstick, Manual, W/O Scope   Result Value Ref Range    POC Blood, Urine Positive (A) Negative    POC Bilirubin, Urine Positive (A) Negative    POC Urobilinogen, Urine positive 0.1 - 1.1    POC Ketones, Urine Negative Negative    POC Protein, Urine Positive (A) Negative    POC Nitrates, Urine Positive (A) Negative    POC Glucose, Urine Negative Negative    pH, UA 7     POC Specific Gravity, Urine 1.010 1.003 - 1.029    POC Leukocytes, Urine Positive (A) Negative           Assessment:       1. Chills    2. Fever, unspecified fever cause    3. Acute cystitis without hematuria    4. Influenza          Plan:       Patient with concerns for influenza viral illness and urinary tract infection bacterial illness with hypotension tachycardia afebrile in clinic tolerates oral medication directed to emergency department for septic workup.  [facility] ER contacted discussed with Dr Carson Lennon pt desire POV to ER for septic workup.  Pt and  verbalized understanding satisfied with plan.  HChills  -     POCT Influenza A/B  -     POCT Urinalysis, Dipstick, Manual, W/O Scope    Fever, unspecified fever cause    Acute cystitis without hematuria    Influenza    Other orders  -     acetaminophen tablet 1,000 mg                   "

## 2022-07-09 NOTE — FIRST PROVIDER EVALUATION
"Medical screening exam completed.  I have conducted a focused provider triage encounter, findings are as follows:    Chief Complaint   Patient presents with    Chills     Patient sent from urgent care dx with UTI and the Flu, fever last night, urgent are thinks she might have blood infection     Brief history of present illness:  51 y.o. female presents to the ED with fever and chills, sent from urgent care for sepsis work up due to vitals taken at the clinic. Patient recently diagnosed with flu as well as UTI. Notes generalized weakness.     Vitals:    07/09/22 0951   BP: 102/67   Pulse: 99   Resp: 18   Temp: 99.7 °F (37.6 °C)   TempSrc: Oral   SpO2: 98%   Weight: 95.3 kg (210 lb)   Height: 5' 4" (1.626 m)       Pertinent physical exam:  Awake, alert, ambulatory, non-labored respirations    Brief workup plan:  Labs, UA    Preliminary workup initiated; this workup will be continued and followed by the physician or advanced practice provider that is assigned to the patient when roomed.  "

## 2022-07-09 NOTE — ED PROVIDER NOTES
Encounter Date: 7/9/2022       History     Chief Complaint   Patient presents with    Chills     Patient sent from urgent care dx with UTI and the Flu, fever last night, urgent are thinks she might have blood infection     51 year old female with symptoms of malaise came in for further follow up. She was diagnosed with a UTI and an Urgent care and was send here for blood work.         Review of patient's allergies indicates:  No Known Allergies  Past Medical History:   Diagnosis Date    Hypertension      Past Surgical History:   Procedure Laterality Date    CHOLECYSTECTOMY       Family History   Problem Relation Age of Onset    Diabetes Mother     Diabetes Father      Social History     Tobacco Use    Smoking status: Never Smoker    Smokeless tobacco: Never Used   Substance Use Topics    Alcohol use: Never    Drug use: Never     Review of Systems   Constitutional: Positive for appetite change, chills, fatigue and fever.   HENT: Negative for sore throat.    Respiratory: Negative for shortness of breath.    Cardiovascular: Negative for chest pain.   Gastrointestinal: Negative for nausea.   Genitourinary: Positive for dysuria and frequency.   Musculoskeletal: Negative for back pain.   Skin: Negative for rash.   Neurological: Negative for weakness.   Hematological: Does not bruise/bleed easily.       Physical Exam     Initial Vitals [07/09/22 0951]   BP Pulse Resp Temp SpO2   102/67 99 18 99.7 °F (37.6 °C) 98 %      MAP       --         Physical Exam    Nursing note and vitals reviewed.  Constitutional: She appears well-developed and well-nourished.   HENT:   Right Ear: Tympanic membrane normal. Tympanic membrane is not erythematous and not bulging.   Left Ear: Tympanic membrane normal. Tympanic membrane is not erythematous and not bulging.   Cardiovascular: Normal rate and regular rhythm.   Pulmonary/Chest: Breath sounds normal.   Abdominal: Abdomen is soft.   No right CVA tenderness.  No left CVA tenderness.      Neurological: She is alert and oriented to person, place, and time.   Skin: Skin is warm. Rash noted.         ED Course   Procedures  Labs Reviewed   COMPREHENSIVE METABOLIC PANEL - Abnormal; Notable for the following components:       Result Value    Glucose Level 129 (*)     Alkaline Phosphatase 154 (*)     All other components within normal limits   CBC WITH DIFFERENTIAL - Abnormal; Notable for the following components:    MCHC 31.5 (*)     All other components within normal limits   LACTIC ACID, PLASMA - Normal   BLOOD CULTURE [facility]   BLOOD CULTURE [facility]   CBC W/ AUTO DIFFERENTIAL    Narrative:     The following orders were created for panel order CBC auto differential.  Procedure                               Abnormality         Status                     ---------                               -----------         ------                     CBC with Differential[[id number]]        Abnormal            Final result                 Please view results for these tests on the individual orders.   URINALYSIS, REFLEX TO URINE CULTURE          Imaging Results    None          Medications   piperacillin-tazobactam (ZOSYN) 4.5 g in dextrose 5 % in water (D5W) 5 % 100 mL IVPB (MB+) (4.5 g Intravenous New Bag 7/9/22 1644)   sodium chloride 0.9% bolus 1,000 mL (0 mLs Intravenous Stopped 7/9/22 1600)     Medical Decision Making:   Clinical Tests:   Lab Tests: Reviewed  ED Management:  Antibiotics Iv, instructed patient to take antibiotics po as prescribed.              ED Course as of 07/09/22 1657   Sat Jul 09, 2022   1611 WBC: 8.9 [YG]   1611 RBC: 5.02 [YG]   1611 Hemoglobin: 14.0 [YG]   1611 Glucose(!): 129 [YG]   1611 Sodium: 137 [YG]   1611 Potassium: 4.3 [YG]   1611 ALT: 19 [YG]   1611 AST: 23 [YG]   1611 Lactic acid, plasma [YG]   1613 Urinalysis, Reflex to Urine Culture Urine, Clean Catch [YG]      ED Course User Index  [YG] ANNIE Rush             Clinical Impression:   Final diagnoses:  [N39.0] Urinary tract  infection without hematuria, site unspecified (Primary)  [J10.1] Influenza A          ED Disposition Condition    Discharge Stable        ED Prescriptions     Medication Sig Dispense Start Date End Date Auth. Provider    cefdinir (OMNICEF) 300 MG capsule Take 1 capsule (300 mg total) by mouth every 12 (twelve) hours. for 10 days 20 capsule 7/9/2022 7/19/2022 ANNIE Rush    oseltamivir (TAMIFLU) 75 MG capsule Take 1 capsule (75 mg total) by mouth 2 (two) times daily. for 5 days 10 capsule 7/9/2022 7/14/2022 ANNIE Rush        Follow-up Information     Follow up With Specialties Details Why Contact Info    [facility] - Emergency Dept Emergency Medicine  If symptoms worsen [address] [facility] [place] [id number] [phone]    García Gamino MD Internal Medicine In 1 week  [address]  [facility] [address]  [phone]             ANNIE Rush  07/09/22 5802

## 2022-07-14 LAB
BACTERIA BLD CULT: NORMAL
BACTERIA BLD CULT: NORMAL

## 2022-09-12 ENCOUNTER — TELEPHONE (OUTPATIENT)
Dept: URGENT CARE | Facility: CLINIC | Age: 52
End: 2022-09-12
Payer: COMMERCIAL

## 2022-09-17 ENCOUNTER — HISTORICAL (OUTPATIENT)
Dept: ADMINISTRATIVE | Facility: HOSPITAL | Age: 52
End: 2022-09-17
Payer: COMMERCIAL

## 2023-02-13 ENCOUNTER — OFFICE VISIT (OUTPATIENT)
Dept: URGENT CARE | Facility: CLINIC | Age: 53
End: 2023-02-13
Payer: COMMERCIAL

## 2023-02-13 VITALS
SYSTOLIC BLOOD PRESSURE: 104 MMHG | RESPIRATION RATE: 17 BRPM | WEIGHT: 213 LBS | OXYGEN SATURATION: 100 % | HEART RATE: 66 BPM | TEMPERATURE: 99 F | DIASTOLIC BLOOD PRESSURE: 69 MMHG | BODY MASS INDEX: 36.37 KG/M2 | HEIGHT: 64 IN

## 2023-02-13 DIAGNOSIS — N30.01 ACUTE CYSTITIS WITH HEMATURIA: Primary | ICD-10-CM

## 2023-02-13 DIAGNOSIS — R30.0 DYSURIA: ICD-10-CM

## 2023-02-13 LAB
BILIRUB UR QL STRIP: POSITIVE
GLUCOSE UR QL STRIP: NEGATIVE
KETONES UR QL STRIP: NEGATIVE
LEUKOCYTE ESTERASE UR QL STRIP: POSITIVE
PH, POC UA: 5
POC BLOOD, URINE: POSITIVE
POC NITRATES, URINE: POSITIVE
PROT UR QL STRIP: POSITIVE
SP GR UR STRIP: 1.02 (ref 1–1.03)
UROBILINOGEN UR STRIP-ACNC: 12 (ref 0.1–1.1)

## 2023-02-13 PROCEDURE — 3074F SYST BP LT 130 MM HG: CPT | Mod: CPTII,,, | Performed by: FAMILY MEDICINE

## 2023-02-13 PROCEDURE — 81003 POCT URINALYSIS, DIPSTICK, MANUAL, W/O SCOPE: ICD-10-PCS | Mod: QW,,, | Performed by: FAMILY MEDICINE

## 2023-02-13 PROCEDURE — 3074F PR MOST RECENT SYSTOLIC BLOOD PRESSURE < 130 MM HG: ICD-10-PCS | Mod: CPTII,,, | Performed by: FAMILY MEDICINE

## 2023-02-13 PROCEDURE — 3008F PR BODY MASS INDEX (BMI) DOCUMENTED: ICD-10-PCS | Mod: CPTII,,, | Performed by: FAMILY MEDICINE

## 2023-02-13 PROCEDURE — 99213 OFFICE O/P EST LOW 20 MIN: CPT | Mod: ,,, | Performed by: FAMILY MEDICINE

## 2023-02-13 PROCEDURE — 1159F PR MEDICATION LIST DOCUMENTED IN MEDICAL RECORD: ICD-10-PCS | Mod: CPTII,,, | Performed by: FAMILY MEDICINE

## 2023-02-13 PROCEDURE — 87186 SC STD MICRODIL/AGAR DIL: CPT | Performed by: FAMILY MEDICINE

## 2023-02-13 PROCEDURE — 3008F BODY MASS INDEX DOCD: CPT | Mod: CPTII,,, | Performed by: FAMILY MEDICINE

## 2023-02-13 PROCEDURE — 3078F PR MOST RECENT DIASTOLIC BLOOD PRESSURE < 80 MM HG: ICD-10-PCS | Mod: CPTII,,, | Performed by: FAMILY MEDICINE

## 2023-02-13 PROCEDURE — 1160F RVW MEDS BY RX/DR IN RCRD: CPT | Mod: CPTII,,, | Performed by: FAMILY MEDICINE

## 2023-02-13 PROCEDURE — 99213 PR OFFICE/OUTPT VISIT, EST, LEVL III, 20-29 MIN: ICD-10-PCS | Mod: ,,, | Performed by: FAMILY MEDICINE

## 2023-02-13 PROCEDURE — 3078F DIAST BP <80 MM HG: CPT | Mod: CPTII,,, | Performed by: FAMILY MEDICINE

## 2023-02-13 PROCEDURE — 81003 URINALYSIS AUTO W/O SCOPE: CPT | Mod: QW,,, | Performed by: FAMILY MEDICINE

## 2023-02-13 PROCEDURE — 1159F MED LIST DOCD IN RCRD: CPT | Mod: CPTII,,, | Performed by: FAMILY MEDICINE

## 2023-02-13 PROCEDURE — 1160F PR REVIEW ALL MEDS BY PRESCRIBER/CLIN PHARMACIST DOCUMENTED: ICD-10-PCS | Mod: CPTII,,, | Performed by: FAMILY MEDICINE

## 2023-02-13 PROCEDURE — 87088 URINE BACTERIA CULTURE: CPT | Performed by: FAMILY MEDICINE

## 2023-02-13 RX ORDER — NITROFURANTOIN 25; 75 MG/1; MG/1
100 CAPSULE ORAL 2 TIMES DAILY
Qty: 14 CAPSULE | Refills: 0 | Status: SHIPPED | OUTPATIENT
Start: 2023-02-13 | End: 2023-02-20

## 2023-02-13 RX ORDER — PHENAZOPYRIDINE HYDROCHLORIDE 200 MG/1
200 TABLET, FILM COATED ORAL 3 TIMES DAILY PRN
Qty: 6 TABLET | Refills: 0 | Status: SHIPPED | OUTPATIENT
Start: 2023-02-13 | End: 2023-02-15

## 2023-02-13 NOTE — PROGRESS NOTES
"Subjective:       Patient ID: Dulce Maria Solis is a 52 y.o. female.    Vitals:  height is 5' 4" (1.626 m) and weight is 96.6 kg (213 lb). Her temperature is 98.6 °F (37 °C). Her blood pressure is 104/69 and her pulse is 66. Her respiration is 17 and oxygen saturation is 100%.     Chief Complaint: Dysuria (Burning and frequency since last night. Took azo. )    HPI:  52-year-old female with known history of hypertension currently on medication.  Follows up with primary MD Dr. Estrella.  Present to clinic with concerns of burning with urination, urgency and frequency since last night.  States 6 UTI since last year.  Has appointment with new primary MD next month.  No change in diet, currently using over-the-counter supplements.  Caution.  Voiced understanding.  Last seen in the clinic for UTI was 7 months ago.  No cultures in the chart.    ROS  :  Constitutional : _No fever, no fatigue or weakness  Neck : _Negative except HPI  Respiratory :_ No coughing, no shortness of breath  Cardiovascular : _No chest pain, no palpitations  Gastrointestinal : _No nausea, no vomiting  Genitourinary : _No flank pain, no vaginal discharge  Integumentary : _Negative for skin rash   Objective:      Physical Exam    General : Alert and Oriented, No apparent distress, afebrile  Neck - supple  Respiratory : Bilateral equal breath sounds, nonlabored respirations, clear to auscultate   Cardiovascular : Rate rhythm regular, normal volume pulse  Gastrointestinal : Soft, mild suprapubic pressure on palpation , BS X 4 quadrants - normal  Genitourinary : No CVA tenderness Bilateral  Integumentary : Warm, Dry and no rash   Assessment:       1. Acute cystitis with hematuria    2. Dysuria         Plan:     Adequate hydration. Medications as prescribed.   Urine CultureAnalysis results will be monitored and reported, and treatment changes made if necessary.  ER precautions with worsening symptoms, fever, flank pain, not able to urinate.   Call or " return to clinic as needed. Voiced understanding verbally.  Urine dipstick results shared positive for blood, positive for leukocytes, positive for protein and nitrates  Repeat urinalysis after 1 week, return to clinic or follow up with primary MD.  Voiced understanding    Acute cystitis with hematuria  -     Urine Culture High Risk  -     nitrofurantoin, macrocrystal-monohydrate, (MACROBID) 100 MG capsule; Take 1 capsule (100 mg total) by mouth 2 (two) times daily. for 7 days  Dispense: 14 capsule; Refill: 0  -     phenazopyridine (PYRIDIUM) 200 MG tablet; Take 1 tablet (200 mg total) by mouth 3 (three) times daily as needed for Pain.  Dispense: 6 tablet; Refill: 0    Dysuria  -     POCT Urinalysis, Dipstick, Manual, w/o Scope

## 2023-02-13 NOTE — PATIENT INSTRUCTIONS
Adequate hydration. Medications as prescribed.   Urine CultureAnalysis results will be monitored and reported, and treatment changes made if necessary.  ER precautions with worsening symptoms, fever, flank pain, not able to urinate.   Call or return to clinic as needed. Voiced understanding verbally.  Urine dipstick results shared positive for blood, positive for leukocytes, positive for protein and nitrates  Repeat urinalysis after 1 week, return to clinic or follow up with primary MD.  Voiced understanding

## 2023-02-15 LAB — BACTERIA UR CULT: ABNORMAL

## 2023-02-15 NOTE — PROGRESS NOTES
Please notify pt that urine with moderate growth of bacteria, with less than 100,000 colonies it is less likely an infection that needs antibiotics.  Stop antibiotics, force fluids, follow up as needed.